# Patient Record
Sex: MALE | Race: WHITE | Employment: OTHER | URBAN - METROPOLITAN AREA
[De-identification: names, ages, dates, MRNs, and addresses within clinical notes are randomized per-mention and may not be internally consistent; named-entity substitution may affect disease eponyms.]

---

## 2020-09-04 ENCOUNTER — HOSPITAL ENCOUNTER (EMERGENCY)
Age: 45
Discharge: HOME | End: 2020-09-04
Attending: EMERGENCY MEDICINE | Admitting: EMERGENCY MEDICINE
Payer: OTHER GOVERNMENT

## 2020-09-04 ENCOUNTER — APPOINTMENT (OUTPATIENT)
Dept: GENERAL RADIOLOGY | Age: 45
End: 2020-09-04
Attending: EMERGENCY MEDICINE
Payer: OTHER GOVERNMENT

## 2020-09-04 VITALS
TEMPERATURE: 98.3 F | HEART RATE: 68 BPM | HEIGHT: 68 IN | SYSTOLIC BLOOD PRESSURE: 104 MMHG | OXYGEN SATURATION: 98 % | BODY MASS INDEX: 25.01 KG/M2 | DIASTOLIC BLOOD PRESSURE: 74 MMHG | WEIGHT: 165 LBS | RESPIRATION RATE: 18 BRPM
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
## 2020-09-04 DIAGNOSIS — S63.502A SPRAIN OF WRIST JOINT, LEFT, INITIAL ENCOUNTER: ICD-10-CM

## 2020-09-04 DIAGNOSIS — S73.102A HIP SPRAIN, LEFT, INITIAL ENCOUNTER: ICD-10-CM

## 2020-09-04 PROCEDURE — 96372 THER/PROPH/DIAG INJ SC/IM: CPT

## 2020-09-04 PROCEDURE — 73502 X-RAY EXAM HIP UNI 2-3 VIEWS: CPT

## 2020-09-04 PROCEDURE — 99284 EMERGENCY DEPT VISIT MOD MDM: CPT

## 2020-09-04 PROCEDURE — 74011250636 HC RX REV CODE- 250/636: Performed by: EMERGENCY MEDICINE

## 2020-09-04 PROCEDURE — 74011250637 HC RX REV CODE- 250/637: Performed by: EMERGENCY MEDICINE

## 2020-09-04 PROCEDURE — 73110 X-RAY EXAM OF WRIST: CPT

## 2020-09-04 RX ORDER — CYCLOBENZAPRINE HCL 10 MG
10 TABLET ORAL
Qty: 12 TAB | Refills: 0 | Status: SHIPPED | OUTPATIENT
Start: 2020-09-04

## 2020-09-04 RX ORDER — OXYCODONE AND ACETAMINOPHEN 5; 325 MG/1; MG/1
1 TABLET ORAL
Status: COMPLETED | OUTPATIENT
Start: 2020-09-04 | End: 2020-09-04

## 2020-09-04 RX ORDER — KETOROLAC TROMETHAMINE 30 MG/ML
30 INJECTION, SOLUTION INTRAMUSCULAR; INTRAVENOUS
Status: COMPLETED | OUTPATIENT
Start: 2020-09-04 | End: 2020-09-04

## 2020-09-04 RX ADMIN — KETOROLAC TROMETHAMINE 30 MG: 30 INJECTION, SOLUTION INTRAMUSCULAR at 16:05

## 2020-09-04 RX ADMIN — OXYCODONE HYDROCHLORIDE AND ACETAMINOPHEN 1 TABLET: 5; 325 TABLET ORAL at 16:05
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
## 2020-09-04 NOTE — ED NOTES
I have reviewed discharge information with the patient. Patient expressed understanding. Vss, pt in NAD. Patient ambulated independently out of care area.  Patient calling for a ride home